# Patient Record
Sex: FEMALE | Race: WHITE | NOT HISPANIC OR LATINO | URBAN - METROPOLITAN AREA
[De-identification: names, ages, dates, MRNs, and addresses within clinical notes are randomized per-mention and may not be internally consistent; named-entity substitution may affect disease eponyms.]

---

## 2023-10-18 PROBLEM — G93.9 CEREBRAL LESION: Status: ACTIVE | Noted: 2023-10-18

## 2023-10-18 PROBLEM — G40.909 SEIZURE DISORDER (MULTI): Status: ACTIVE | Noted: 2023-10-18

## 2023-10-18 RX ORDER — OMEPRAZOLE 40 MG/1
40 CAPSULE, DELAYED RELEASE ORAL DAILY
COMMUNITY
Start: 2023-01-05

## 2023-10-18 RX ORDER — ALPRAZOLAM 0.5 MG/1
TABLET ORAL
COMMUNITY
Start: 2023-01-05

## 2023-10-18 RX ORDER — FAMOTIDINE 20 MG/1
TABLET, FILM COATED ORAL
COMMUNITY
Start: 2023-01-05

## 2023-10-19 ENCOUNTER — APPOINTMENT (OUTPATIENT)
Dept: NEUROSURGERY | Facility: CLINIC | Age: 53
End: 2023-10-19
Payer: COMMERCIAL

## 2023-10-30 ENCOUNTER — APPOINTMENT (OUTPATIENT)
Dept: NEUROSURGERY | Facility: HOSPITAL | Age: 53
End: 2023-10-30
Payer: COMMERCIAL

## 2023-11-20 ENCOUNTER — OFFICE VISIT (OUTPATIENT)
Dept: NEUROSURGERY | Facility: HOSPITAL | Age: 53
End: 2023-11-20
Payer: COMMERCIAL

## 2023-11-20 VITALS — DIASTOLIC BLOOD PRESSURE: 82 MMHG | HEART RATE: 60 BPM | SYSTOLIC BLOOD PRESSURE: 130 MMHG | RESPIRATION RATE: 15 BRPM

## 2023-11-20 DIAGNOSIS — G93.9 CEREBRAL LESION: Primary | ICD-10-CM

## 2023-11-20 PROCEDURE — 99213 OFFICE O/P EST LOW 20 MIN: CPT | Performed by: NEUROLOGICAL SURGERY

## 2023-11-20 NOTE — PROGRESS NOTES
Chief Complaint:   FUV     History Of Present Illness:      Katty Casillas is a 53-year-old female with a PMH significant for GERD who presented for evaluation in January of this year after having been evaluated at [facility] for right sided facial numbness and drooping in 11/2022. Workup at that time as significant for left temporal lesion consistent with 1cm vascular malformation. She was recommended for angiogram which was performed 02/16 and demonstrated no evidence of early venous drainage to suggest a vascular malformation. Patient was evaluated by neurology given concern for possible seizures. EEG at initial workup was normal. Patient was seen by Dr. Eron Sandhu who recommended a prolonged 24 hour VEEG to further evaluate if she is having epileptiform activity and seizures.  Repeat MRI Brain was recommended and completed 08/25/23 significant for a 1.2cm nonenhancing lesion in the left temporal lobe. Imaging was reviewed at [facility] conference and recommended for patient to follow up in clinic. She presents for FUV.     She never had video EEG; she is having no neurological symptoms; she is not on any medications for seizures      Vital Signs   There were no vitals taken for this visit.         Physical Exam:  Awakd and alert  Speech fluent  Cranial nerves grossly normal  No focal weakness  Gait normal        Past Medical History:   has no past medical history on file.    Past Surgical History:    has a past surgical history that includes Other surgical history (01/05/2023); Other surgical history (01/05/2023); and Other surgical history (01/05/2023).    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  Asymptomatic cavernoma  I would recommend observation repeat MRI in 9-12 months;  we discussed natural history and risks of seizures.

## 2025-01-19 DIAGNOSIS — G93.9 CEREBRAL LESION: Primary | ICD-10-CM

## 2025-02-14 ENCOUNTER — HOSPITAL ENCOUNTER (OUTPATIENT)
Dept: RADIOLOGY | Facility: HOSPITAL | Age: 55
Discharge: HOME | End: 2025-02-14
Payer: COMMERCIAL

## 2025-02-14 DIAGNOSIS — G93.9 CEREBRAL LESION: ICD-10-CM

## 2025-02-14 PROCEDURE — 70553 MRI BRAIN STEM W/O & W/DYE: CPT

## 2025-02-14 PROCEDURE — 2550000001 HC RX 255 CONTRASTS: Performed by: NURSE PRACTITIONER

## 2025-02-14 PROCEDURE — A9575 INJ GADOTERATE MEGLUMI 0.1ML: HCPCS | Performed by: NURSE PRACTITIONER

## 2025-02-14 RX ORDER — GADOTERATE MEGLUMINE 376.9 MG/ML
15 INJECTION INTRAVENOUS
Status: COMPLETED | OUTPATIENT
Start: 2025-02-14 | End: 2025-02-14

## 2025-02-14 RX ADMIN — GADOTERATE MEGLUMINE 15 ML: 376.9 INJECTION INTRAVENOUS at 14:43
